# Patient Record
(demographics unavailable — no encounter records)

---

## 2024-11-07 NOTE — ASSESSMENT
[FreeTextEntry1] : 71 yr old  with recent episode of perforated diverticulitis , responded well to treatment .

## 2024-11-07 NOTE — PHYSICAL EXAM
[Normal Breath Sounds] : Normal breath sounds [Normal Heart Sounds] : normal heart sounds [Abdominal Masses] : No abdominal masses [Abdomen Tenderness] : ~T ~M No abdominal tenderness [Tender] : nontender [Enlarged] : not enlarged [de-identified] : WNL [de-identified] : WNL

## 2024-11-07 NOTE — HISTORY OF PRESENT ILLNESS
[de-identified] : 71 yr old female  presents for evaluation  s/p recent admission for perforated diverticulitis with an abscess . She was treated with IV antibiotics and percutaneous drainage by IR. She clinically improved rapidly, repeat CT showed resolution of collection and drain removed prior to discharge. She presents feeling well , denies any pain ,, fever or chills.